# Patient Record
(demographics unavailable — no encounter records)

---

## 2024-11-07 NOTE — PHYSICAL EXAM
[NL] : moves all extremities x4, warm, well perfused x4 [FreeTextEntry5] :  EOMI, sclera normal; no conjunctival injection [FreeTextEntry3] : external ears appear normal without malformation or trauma [FreeTextEntry7] : breathing comfortably; no observed respiratory distress

## 2024-11-07 NOTE — DISCUSSION/SUMMARY
[FreeTextEntry1] : Current vaccine records from [place] show that pt is missing Varicella dose #2 and Polio dose #4 routine labs and varicella titers ordered will update [facility] records advised pt to rto next week for official vaccine consult/ consent form and test results pt agrees with plan

## 2024-11-13 NOTE — HISTORY OF PRESENT ILLNESS
[FreeTextEntry6] : pt presenting for test results; no current complaints Vaccines UTD with exception of Flu; pt has no record of previous Flu vaccines and reports that her family does not get them

## 2024-11-13 NOTE — DISCUSSION/SUMMARY
[FreeTextEntry1] : Discussed Results: CBC: wnl A1C: wnl: 5.1 CMP: wnl Vit D: slightly elevated but wnl TSH wnl Lipids: wnl  +Varicella immunity Vaccines UTD   Elevated BP Reading in office today; will repeat at next exam  consider cardio ref for repetitive elevations

## 2024-11-13 NOTE — PHYSICAL EXAM
[NL] : warm, clear [FreeTextEntry5] :  EOMI, sclera normal; no conjunctival injection [FreeTextEntry3] : external ears appear normal without malformation or trauma [FreeTextEntry7] : breathing comfortably; no observed respiratory distress [de-identified] : WWP, no rashes or concerning lesions noted